# Patient Record
Sex: FEMALE | Race: WHITE | NOT HISPANIC OR LATINO | Employment: UNEMPLOYED | URBAN - METROPOLITAN AREA
[De-identification: names, ages, dates, MRNs, and addresses within clinical notes are randomized per-mention and may not be internally consistent; named-entity substitution may affect disease eponyms.]

---

## 2022-04-01 ENCOUNTER — OFFICE VISIT (OUTPATIENT)
Dept: OBGYN | Facility: CLINIC | Age: 22
End: 2022-04-01
Attending: REGISTERED NURSE
Payer: OTHER GOVERNMENT

## 2022-04-01 VITALS
SYSTOLIC BLOOD PRESSURE: 105 MMHG | WEIGHT: 157.6 LBS | HEART RATE: 76 BPM | DIASTOLIC BLOOD PRESSURE: 69 MMHG | BODY MASS INDEX: 23.89 KG/M2 | HEIGHT: 68 IN

## 2022-04-01 DIAGNOSIS — Z11.3 SCREENING EXAMINATION FOR SEXUALLY TRANSMITTED DISEASE: ICD-10-CM

## 2022-04-01 DIAGNOSIS — Z97.5 NEXPLANON IN PLACE: ICD-10-CM

## 2022-04-01 DIAGNOSIS — Z30.017 INSERTION OF IMPLANTABLE SUBDERMAL CONTRACEPTIVE: Primary | ICD-10-CM

## 2022-04-01 PROBLEM — G43.809 OTHER MIGRAINE, NOT INTRACTABLE, WITHOUT STATUS MIGRAINOSUS: Status: ACTIVE | Noted: 2022-04-01

## 2022-04-01 PROBLEM — M92.529 OSGOOD-SCHLATTER'S DISEASE: Status: ACTIVE | Noted: 2022-04-01

## 2022-04-01 PROBLEM — M41.9 SCOLIOSIS: Status: ACTIVE | Noted: 2022-04-01

## 2022-04-01 LAB
HCG UR QL: NEGATIVE
INTERNAL QC OK POCT: NORMAL
POCT KIT EXPIRATION DATE: NORMAL
POCT KIT LOT NUMBER: NORMAL

## 2022-04-01 PROCEDURE — 87591 N.GONORRHOEAE DNA AMP PROB: CPT | Performed by: REGISTERED NURSE

## 2022-04-01 PROCEDURE — 87491 CHLMYD TRACH DNA AMP PROBE: CPT | Performed by: REGISTERED NURSE

## 2022-04-01 PROCEDURE — G0463 HOSPITAL OUTPT CLINIC VISIT: HCPCS

## 2022-04-01 PROCEDURE — 81025 URINE PREGNANCY TEST: CPT | Performed by: REGISTERED NURSE

## 2022-04-01 PROCEDURE — 11981 INSERTION DRUG DLVR IMPLANT: CPT | Performed by: REGISTERED NURSE

## 2022-04-01 PROCEDURE — 250N000011 HC RX IP 250 OP 636: Performed by: REGISTERED NURSE

## 2022-04-01 RX ORDER — SPIRONOLACTONE 50 MG/1
TABLET, FILM COATED ORAL
COMMUNITY
Start: 2021-03-26

## 2022-04-01 RX ORDER — SUMATRIPTAN 50 MG/1
50 TABLET, FILM COATED ORAL
COMMUNITY
Start: 2022-01-11 | End: 2023-01-11

## 2022-04-01 RX ORDER — LACTOBACILLUS RHAMNOSUS GG 10B CELL
1 CAPSULE ORAL 2 TIMES DAILY
COMMUNITY

## 2022-04-01 RX ADMIN — ETONOGESTREL 68 MG: 68 IMPLANT SUBCUTANEOUS at 14:52

## 2022-04-01 NOTE — PROGRESS NOTES
"Betty has been on and off the pill since age 14 and has bothersome side effects like acne and weight gain. Has also had Caitlin, Mirena and Kyleena IUDs before. Didn't have any weight gain or acne side effects with the progesterone IUDs; she reports that the IUDs were all difficult to place and eventually would spontaneously expel in her cervix due to malposition. She is hoping the nexplanon will be a good option for her.      Had intercourse yesterday, using condoms and OCP. LMP 3/23/22. Regular monthly cycle Q 28 days, has taken OPC consistently since September. Did take pill this morning.     Nexplanon Insertion:    Is a pregnancy test required: Yes.  Was it positive or negative?  Negative  Was a consent obtained?  Yes    Subjective: Betty Garcia is a 21 year old No obstetric history on file. presents for Nexplanon.    Patient has been given the opportunity to ask questions about all forms of birth control, including all options appropriate for Betty Garcia. Discussed that no method of birth control, except abstinence is 100% effective against pregnancy or sexually transmitted infection.     Betty Garcia understands she may have the Nexplanon removed at any time and it should be removed by a health care provider.    The entire insertion procedure was reviewed with the patient, including care after placement.    No LMP recorded. Last sexual activity: 3/31/22. No allergy to betadine or shellfish. Patient desires STD screening  HCG Qual Urine   Date Value Ref Range Status   04/01/2022 Negative Negative Final       /69 (BP Location: Left arm, Patient Position: Chair)   Pulse 76   Ht 1.727 m (5' 8\")   Wt 71.5 kg (157 lb 9.6 oz)   BMI 23.96 kg/m      PROCEDURE NOTE: -- Nexplanon Insertion    Reason for Insertion: contraception    Patient was placed supine with left arm exposed.  Jaskaran was made 8-10 cm above medial epicondyle and a guiding jaskaran 4 cm above the first.  Arm was prepped with " Betadine. Insertion point was anesthetized with 2 mL 1% lidocaine. After stretching the skin with thumb and index finger around the insertion site, skin punctured with the tip of the needle inserted at 30 degrees and then lowered to horizontal position. The needle was then advanced to its full length. Applicator was then stabilized and slider was unlocked. Slider was pulled back until it stopped and then removed.    Correct placement of the implant was confirmed by palpation in the patient's arm and visualizing the purple top of the obturator.   Bandage and pressure dressing applied to insertion site.    Lot # [id number]  Exp: MAY 12 2024    EBL: minimal    Complications: none    ASSESSMENT:     ICD-10-CM    1. Insertion of implantable subdermal contraceptive  Z30.017 etonogestrel (NEXPLANON) 68 MG IMPL     etonogestrel (NEXPLANON) subdermal implant 68 mg     INSERTION NON-BIODEGRADABLE DRUG DELIVERY IMPLANT     hCG qual urine POCT   2. Screening examination for sexually transmitted disease  Z11.3 Chlamydia trachomatis PCR     Neisseria gonorrhoeae PCR        PLAN:    Given 's handouts, including when to have Nexplanon removed, list of danger s/sx, side effects and follow up recommended. Encouraged condom use for prevention of STD. Back up contraception advised for 7 days. Advised to call for any fever, for prolonged or severe pain or bleeding, abnormal vaginal dischage. She was advised to use pain medications (ibuprofen) as needed for mild to moderate pain.     Orders placed: urine GCCT     AMANDA Tyson CNM

## 2022-04-01 NOTE — LETTER
4/1/2022       RE: Betty Garcia  [address]  [place] [address]     Dear Colleague,    Thank you for referring your patient, Betty Garcia, to the [facility] [place] [facility]. Please see a copy of my visit note below.    Betty has been on and off the pill since age 14 and has bothersome side effects like acne and weight gain. Has also had Caitlin, Mirena and Kyleena IUDs before. Didn't have any weight gain or acne side effects with the progesterone IUDs; she reports that the IUDs were all difficult to place and eventually would spontaneously expel in her cervix due to malposition. She is hoping the nexplanon will be a good option for her.      Had intercourse yesterday, using condoms and OCP. LMP 3/23/22. Regular monthly cycle Q 28 days, has taken OPC consistently since September. Did take pill this morning.     Nexplanon Insertion:    Is a pregnancy test required: Yes.  Was it positive or negative?  Negative  Was a consent obtained?  Yes    Subjective: Betty Garcia is a 21 year old No obstetric history on file. presents for Nexplanon.    Patient has been given the opportunity to ask questions about all forms of birth control, including all options appropriate for Betty Garcia. Discussed that no method of birth control, except abstinence is 100% effective against pregnancy or sexually transmitted infection.     Betty Garcia understands she may have the Nexplanon removed at any time and it should be removed by a health care provider.    The entire insertion procedure was reviewed with the patient, including care after placement.    No LMP recorded. Last sexual activity: 3/31/22. No allergy to betadine or shellfish. Patient desires STD screening  HCG Qual Urine   Date Value Ref Range Status   04/01/2022 Negative Negative Final       /69 (BP Location: Left arm, Patient Position: Chair)   Pulse  "76   Ht 1.727 m (5' 8\")   Wt 71.5 kg (157 lb 9.6 oz)   BMI 23.96 kg/m      PROCEDURE NOTE: -- Nexplanon Insertion    Reason for Insertion: contraception    Patient was placed supine with left arm exposed.  Su was made 8-10 cm above medial epicondyle and a guiding su 4 cm above the first.  Arm was prepped with Betadine. Insertion point was anesthetized with 2 mL 1% lidocaine. After stretching the skin with thumb and index finger around the insertion site, skin punctured with the tip of the needle inserted at 30 degrees and then lowered to horizontal position. The needle was then advanced to its full length. Applicator was then stabilized and slider was unlocked. Slider was pulled back until it stopped and then removed.    Correct placement of the implant was confirmed by palpation in the patient's arm and visualizing the purple top of the obturator.   Bandage and pressure dressing applied to insertion site.    Lot # [id number]  Exp: MAY 12 2024    EBL: minimal    Complications: none    ASSESSMENT:     ICD-10-CM    1. Insertion of implantable subdermal contraceptive  Z30.017 etonogestrel (NEXPLANON) 68 MG IMPL     etonogestrel (NEXPLANON) subdermal implant 68 mg     INSERTION NON-BIODEGRADABLE DRUG DELIVERY IMPLANT     hCG qual urine POCT   2. Screening examination for sexually transmitted disease  Z11.3 Chlamydia trachomatis PCR     Neisseria gonorrhoeae PCR        PLAN:    Given 's handouts, including when to have Nexplanon removed, list of danger s/sx, side effects and follow up recommended. Encouraged condom use for prevention of STD. Back up contraception advised for 7 days. Advised to call for any fever, for prolonged or severe pain or bleeding, abnormal vaginal dischage. She was advised to use pain medications (ibuprofen) as needed for mild to moderate pain.     Orders placed: urine GCCT     AMANDA Tyson CNM             Again, thank you for allowing me to participate in the care of your " patient.      Sincerely,    AMANDA Tyson CNM

## 2022-04-01 NOTE — LETTER
Date:April 2, 2022      Patient was self referred, no letter generated. Do not send.        [facility] Health Information

## 2022-04-02 LAB
C TRACH DNA SPEC QL NAA+PROBE: NEGATIVE
N GONORRHOEA DNA SPEC QL NAA+PROBE: NEGATIVE

## 2022-06-02 ENCOUNTER — TELEPHONE (OUTPATIENT)
Dept: OBGYN | Facility: CLINIC | Age: 22
End: 2022-06-02
Payer: OTHER GOVERNMENT

## 2022-06-02 NOTE — TELEPHONE ENCOUNTER
Health Call Center    Phone Message    May a detailed message be left on voicemail: yes     Reason for Call: Requesting Results   Name/type of test: testing done at n4/1 appt  Date of test: 4/1/22  Was test done at a location other than [facility] (Please fill in the location if not [facility])?: No      Action Taken: Other: whs    Travel Screening: Not Applicable

## 2022-06-03 NOTE — TELEPHONE ENCOUNTER
Returned call, LVM stating all lab results were negative/WNL. Advised to call back if any further questions.

## 2022-07-24 ENCOUNTER — HEALTH MAINTENANCE LETTER (OUTPATIENT)
Age: 22
End: 2022-07-24

## 2022-10-02 ENCOUNTER — HEALTH MAINTENANCE LETTER (OUTPATIENT)
Age: 22
End: 2022-10-02

## 2023-08-12 ENCOUNTER — HEALTH MAINTENANCE LETTER (OUTPATIENT)
Age: 23
End: 2023-08-12

## 2024-10-05 ENCOUNTER — HEALTH MAINTENANCE LETTER (OUTPATIENT)
Age: 24
End: 2024-10-05